# Patient Record
Sex: FEMALE | Race: WHITE
[De-identification: names, ages, dates, MRNs, and addresses within clinical notes are randomized per-mention and may not be internally consistent; named-entity substitution may affect disease eponyms.]

---

## 2019-03-19 ENCOUNTER — HOSPITAL ENCOUNTER (OUTPATIENT)
Dept: HOSPITAL 62 - SP | Age: 34
End: 2019-03-19
Attending: PSYCHIATRY & NEUROLOGY
Payer: OTHER GOVERNMENT

## 2019-03-19 DIAGNOSIS — G45.9: Primary | ICD-10-CM

## 2019-03-19 PROCEDURE — 93306 TTE W/DOPPLER COMPLETE: CPT

## 2019-03-19 NOTE — [facility] REPORT
[address] [address]

                               Tel: [phone]

                               Fax: [phone]



                      Transthoracic Echocardiogram Report

_______________________________________________________________________________



Name: DOUGIE BERRIOS

MRN: [id number]                                Age: 34 yrs

Gender: Female                                 : 1985

Patient Status: Outpatient                     Patient Location: [facility]

Account #: [id number]

Study Date: 2019 01:04 PM

Accession #: [id number]

_______________________________________________________________________________



Height: 65 in        Weight: 170 lb        BSA: 1.8 m2

_______________________________________________________________________________

Procedure: A complete two-dimensional transthoracic echocardiogram was

performed (2D, M-mode, spectral and color flow Doppler). The study was

technically adequate with some images being suboptimal in quality.

Reason For Study: TIA





Ordering Physician: DAVID CADE

Performed By: Danyelle Rico



_______________________________________________________________________________



Interpretation Summary

The left ventricular ejection fraction is normal.

There is normal left ventricular wall thickness.

The left ventricle is grossly normal size.

Doppler measurements suggest normal left ventricular diastolic function

Wall motion cannot be accurately commented on, but no definite regional wall

motion abnormalities noted.

The right ventricular systolic function is normal.

The left atrial size is normal.

The right atrium is normal.

There is a trace amount of mitral regurgitation

There is no mitral valve stenosis.

No aortic regurgitation is present.

There is no aortic valve stenosis

There is a trace amount of tricuspid regurgitation

Right ventricular systolic pressure is at the upper limits of normal

The aortic root is not well visualized but is probably normal size.

The inferior vena cava was not well visualized

No definite cardiac source of CVA/TIA noted on this particular trans-thoracic

study.

Consider PAO if clinically indicated.

May consider mobile cardiac telemetry monitoring (MCT) for ruling out

transient AFIB.



MMode/2D Measurements & Calculations

RVDd: 2.8 cm   LVIDd: 4.7 cm  FS: 42.0 %              Ao root diam: 2.9 cm

IVSd: 0.79 cm  LVIDs: 2.8 cm  EDV(Teich): 104.4 ml

                                                      Ao root area: 6.4 cm2

               LVPWd: 1.0 cm  ESV(Teich): 28.3 ml

                              EF(Teich): 72.9 %



Doppler Measurements & Calculations

MV E max josi:       MV dec slope:        Ao V2 max:         LV V1 max P.3 cm/sec         512.1 cm/sec2        98.7 cm/sec        3.7 mmHg

MV A max josi:       MV dec time: 0.16 secAo max PG: 3.9 mmHgLV V1 max:

61.8 cm/sec                                                 96.6 cm/sec

MV E/A: 1.3

        _______________________________________________________________

PA V2 max:          PI end-d josi:        TR max josi:

95.6 cm/sec         62.7 cm/sec          241.2 cm/sec

PA max PG: 3.7 mmHg                      TR max P.3 mmHg





Left Ventricle

The left ventricle is grossly normal size. There is normal left ventricular

wall thickness. The left ventricular ejection fraction is normal. Doppler

measurements suggest normal left ventricular diastolic function. Wall motion

cannot be accurately commented on, but no definite regional wall motion

abnormalities noted.



Right Ventricle

The right ventricle is grossly normal size. There is normal right ventricular

wall thickness. The right ventricular systolic function is normal.



Atria

The right atrium is normal. The left atrial size is normal. Interarterial

septum not well visualized and not well dopplered. Cannot comment on ASD/PFO

presence.



Mitral Valve

The mitral valve is grossly normal. There is no mitral valve stenosis. There

is a trace amount of mitral regurgitation.





Aortic Valve

The aortic valve is grossly normal. There is no aortic valve stenosis. No

aortic regurgitation is present.



Tricuspid Valve

The tricuspid valve is not well visualized, but is grossly normal. There is no

tricuspid stenosis. There is a trace amount of tricuspid regurgitation. Right

ventricular systolic pressure is at the upper limits of normal.



Pulmonic Valve

The pulmonic valve is not well visualized.



Great Vessels

The aortic root is not well visualized but is probably normal size. The

inferior vena cava was not well visualized.





Effusions

There is no pericardial effusion.



Incidental Findings

No definite cardiac source of CVA/TIA noted on this particular trans-thoracic

study. Consider PAO if clinically indicated. May consider mobile cardiac

telemetry monitoring (MCT) for ruling out transient AFIB.



_______________________________________________________________________________



_______________________________________________________________________________

Electronically signed by:      Abdiel Vaz      on 2019 05:31 PM



CC: DAVID CADE Shyamal